# Patient Record
Sex: FEMALE | Race: WHITE | URBAN - METROPOLITAN AREA
[De-identification: names, ages, dates, MRNs, and addresses within clinical notes are randomized per-mention and may not be internally consistent; named-entity substitution may affect disease eponyms.]

---

## 2017-01-02 ENCOUNTER — OFFICE VISIT (OUTPATIENT)
Dept: FAMILY MEDICINE | Facility: CLINIC | Age: 22
End: 2017-01-02
Payer: COMMERCIAL

## 2017-01-02 VITALS
WEIGHT: 147 LBS | HEIGHT: 69 IN | HEART RATE: 63 BPM | DIASTOLIC BLOOD PRESSURE: 70 MMHG | RESPIRATION RATE: 15 BRPM | BODY MASS INDEX: 21.77 KG/M2 | TEMPERATURE: 99.6 F | SYSTOLIC BLOOD PRESSURE: 122 MMHG | OXYGEN SATURATION: 97 %

## 2017-01-02 DIAGNOSIS — B08.1 MOLLUSCUM CONTAGIOSUM: Primary | ICD-10-CM

## 2017-01-02 PROBLEM — Z30.41 ENCOUNTER FOR SURVEILLANCE OF CONTRACEPTIVE PILLS: Status: ACTIVE | Noted: 2017-01-02

## 2017-01-02 PROCEDURE — 99213 OFFICE O/P EST LOW 20 MIN: CPT | Performed by: PHYSICIAN ASSISTANT

## 2017-01-02 RX ORDER — DROSPIRENONE AND ETHINYL ESTRADIOL 0.03MG-3MG
1 KIT ORAL DAILY
Qty: 84 TABLET | Refills: 3 | Status: CANCELLED | OUTPATIENT
Start: 2017-01-02

## 2017-01-02 RX ORDER — IMIQUIMOD 12.5 MG/.25G
CREAM TOPICAL
Qty: 12 PACKET | Refills: 2 | Status: SHIPPED | OUTPATIENT
Start: 2017-01-02 | End: 2018-11-23

## 2017-01-02 NOTE — PATIENT INSTRUCTIONS
Molluscum Contagiosum   WHAT ARE MOLLUCSCUM?  Molluscum are smooth, pearly, flesh-colored skin growths caused by a virus that lives in the skin. They begin as small bumps and may grow as large as a pencil eraser. Many have a central pit where the virus bodies live. Usually, Molluscum are found on the face and body, but they may grow elsewhere.     Molluscum can be itchy and a red scaly rash can occur around the lesions termed  Molluscum dermatitis.  Molluscum can be spreads to other parts of the body as a child scratches. The bumps usually last from weeks to one and a half years and can go away on their own. Molluscum may be passed from child to child, but it is not infectious like chicken pox, and no isolation measures need to be taken. Clusters of infected children have been identified who used the same water park or pool, so they may be spread in pools or bathtubs. To prevent infecting others:  1. Keep areas with Molluscum covered with clothing or bandages when in contact with other people.   2. Do not share clothing, towels or other personal items; do not bathe an infected child with other individuals.     TREATMENT:  Although Molluscum will eventually resolve, they are often removed because they can itch, irritate, spread easily, become infected or are sometimes not cosmetically pleasing. Discomfort should be avoided when Molluscum is treated. Scarring is possible whether the lesions are treated or not.  Treatment depends on the age of the patient and the size and location of the growths.  1. Tretinoin (Retin-A) cream: This is often give for facial lesions. Apply to each bump with cotton tipped applicator once a day for several weeks. If irritation is severe, stop treatment for 1-2 days and then resume if necessary.    2. Liquid Nitrogen: Is applied with a special canister or cotton tipped applicator. If may form a blister or irritation at the site. Liquid nitrogen will not always remove the Molluscum.  Sometimes we recommend topical treatments following liquid nitrogen therapy; however you should not start these treatments until the site can tolerate them. Wait at least 7 days after liquid nitrogen therapy to begin/resume your topical treatments.  3. Imiquimod 5% cream (Aldara): Is an agent that locally stimulates the patient s immune system, or infection fighting abilities, and is helpful in some cases. It is not yet FDA approved for children less than 12 years of age, but is often used  off-label) in children for the treatment of both warts and Molluscum. The medicine can cause significant irritation in some children and for that reason we usually start treatment at three times a week, increasing slowly to daily application as tolerated. The cream should only be used on the affected areas, and extensive application can rarely cause  flu-like  symptoms.

## 2017-01-02 NOTE — PROGRESS NOTES
SUBJECTIVE:     CC: Janice Davis is an 21 year old woman who presents for preventive health visit.     Healthy Habits:  Answers for HPI/ROS submitted by the patient on 12/31/2016   Annual Exam:  Getting at least 3 servings of Calcium per day:: Yes  Bi-annual eye exam:: Yes  Dental care twice a year:: Yes  Sleep apnea or symptoms of sleep apnea:: None  Diet:: Regular (no restrictions)  Frequency of exercise:: 2-3 days/week  Duration of exercise:: 45-60 minutes  Taking medications regularly:: Yes  Medication side effects:: Not applicable  Additional concerns today:: No  PHQ-2 Depressed: Not at all, Not at all  PHQ-2 Score: 0    {Outside tests to abstract? :072591}    {additional problems to add:697118}    Today's PHQ-2 Score:   PHQ-2 ( 1999 Pf[employer]) 12/31/2016 12/29/2016   Q1: Little interest or pleasure in doing things - -   Q2: Feeling down, depressed or hopeless - -   PHQ-2 Score - -   Little interest or pleasure in doing things Not at all Not at all   Feeling down, depressed or hopeless Not at all Not at all   PHQ-2 Score 0 0     {PHQ-2 LOOK IN ASSESSMENTS :310428}  Abuse: Current or Past(Physical, Sexual or Emotional)- {YES/NO/NA:176551}  Do you feel safe in your environment - {YES/NO/NA:026346}    Social History   Substance Use Topics     Smoking status: Never Smoker      Smokeless tobacco: Never Used     Alcohol Use: No     {ETOH AUDIT:244511}    No results for input(s): CHOL, HDL, LDL, TRIG, CHOLHDLRATIO, NHDL in the last 60813 hours.    Reviewed orders with patient.  Reviewed health maintenance and updated orders accordingly - Yes    Mammo Decision Support:  Mammogram not appropriate for this patient based on age.    Pertinent mammograms are reviewed under the imaging tab.  History of abnormal Pap smear: NO - age 21-29 PAP every 3 years recommended  All Histories reviewed and updated in Epic.    ROS:  {normal premenopausal female:953931}    Problem list, Medication list, Allergies, and  "Medical/Social/Surgical histories reviewed in [facility] and updated as appropriate.  BP Readings from Last 3 Encounters:   08/30/16 118/84   08/09/16 124/86   08/06/15 108/70    Wt Readings from Last 3 Encounters:   08/30/16 144 lb 8 oz (65.545 kg)   08/09/16 147 lb (66.679 kg)   08/06/15 161 lb (73.029 kg) (87.75 %*)     * Growth percentiles are based on [facility] 2-20 Years data.         OBJECTIVE:     There were no vitals taken for this visit.  EXAM:  {FEMALE - complete :290223}    ASSESSMENT/PLAN:         ICD-10-CM    1. Routine general medical examination at a health care facility Z00.00    2. Screening for cervical cancer Z12.4    3. Molluscum contagiosum B08.1        COUNSELING:   {FEMALE COUNSELING MESSAGES:587872::\"Reviewed preventive health counseling, as reflected in patient instructions\"}       reports that she has never smoked. She has never used smokeless tobacco.    Estimated body mass index is 21.50 kg/(m^2) as calculated from the following:    Height as of 8/30/16: 5' 8.75\" (1.746 m).    Weight as of 8/30/16: 144 lb 8 oz (65.545 kg).       Counseling Resources:  ATP IV Guidelines  Pooled Cohorts Equation Calculator  Breast Cancer Risk Calculator  FRAX Risk Assessment  ICSI Preventive Guidelines  Dietary Guidelines for Americans, 2010  USDA's MyPlate  ASA Prophylaxis  Lung CA Screening    Clara Flores PA-C  [facility]  "

## 2017-01-02 NOTE — PROGRESS NOTES
SUBJECTIVE:                                                    Janice Davis is a 21 year old female who presents to clinic today for the following health issues:    Concern - mollusum growths     Onset: 2 weeks    Description:   Patient states that she has a growth in her vaginal area; not painful, 2, no drainage. Also has one on her right knee.    Intensity: mild, moderate    Progression of Symptoms:  worsening    Accompanying Signs & Symptoms:  See above   Previous history of similar problem:   n/a    Precipitating factors:   Worsened by: n/a    Alleviating factors:  Improved by: n/a       Therapies Tried and outcome: cut them off-biopsied   Additional complaints: None    HPI additional notes: Janice presents today with   Chief Complaint   Patient presents with     Other     molluscum growth   Previous lesion that was biopsied has not returned but has seen a new lesion in her upper thigh and and another by her right knee.         ROS:  C: Negative for fever, chills, recent change in weight  Skin: POSITIVE for skin lesion. Negative for discharge and pruritis  ENT: Negative for ear, mouth and throat problems  Resp: Negative for significant cough or SOB  CV: Negative for chest pain or peripheral edema  P: Negative for changes in mood or affect  ROS otherwise negative.    Chart Review:  History   Smoking status     Never Smoker    Smokeless tobacco     Never Used     Patient Active Problem List   Diagnosis     UC (ulcerative colitis) (H)     Encounter for surveillance of contraceptive pills     Molluscum contagiosum     Past Surgical History   Procedure Laterality Date     Endoscopy upper, colonoscopy, combined N/A 7/8/2015     Procedure: COMBINED ENDOSCOPY UPPER, COLONOSCOPY;  Surgeon: Mina Lloyd MD;  Location:  OR     Colonoscopy N/A 7/8/2015     Procedure: COMBINED COLONOSCOPY, SINGLE OR MULTIPLE BIOPSY/POLYPECTOMY BY BIOPSY;  Surgeon: Mina Lloyd MD;  Location:  OR     Esophagoscopy, gastroscopy,  "duodenoscopy (egd), combined N/A 7/8/2015     Procedure: COMBINED ESOPHAGOSCOPY, GASTROSCOPY, DUODENOSCOPY (EGD), BIOPSY SINGLE OR MULTIPLE;  Surgeon: Mina Lloyd MD;  Location: [facility] OR     Problem list, Medication list, Allergies, Medical/Social/Surg hx reviewed in [facility], updated as appropriate.   OBJECTIVE:                                                    /70 mmHg  Pulse 63  Temp(Src) 99.6  F (37.6  C) (Tympanic)  Resp 15  Ht 5' 9\" (1.753 m)  Wt 147 lb (66.679 kg)  BMI 21.70 kg/m2  SpO2 97%  LMP 12/12/2016 (Approximate)  Body mass index is 21.7 kg/(m^2).  GENERAL: healthy, alert, in no acute distress  SKIN: 3 3-4 mm flesh colored papules, first on lateral right knee, other two on medial right thigh, non-tender to palpation with no erythema, warmth or edema.  PSYCH: Alert and oriented times 3;  Able to articulate logical thoughts. Affect is normal.    Diagnostic test results: none      ASSESSMENT/PLAN:                                                          ICD-10-CM    1. Molluscum contagiosum B08.1 imiquimod (ALDARA) 5 % cream     Declines freezing today.  Will start at home treatment.  Side effects discussed.  Will send message if irritating skin and can try tretinoin or freezing in office.  Discussed risk of transmission.      Please see patient instructions for treatment details.    Follow up as needed.    Clara Flores PA-C  [facility]         "

## 2017-01-02 NOTE — NURSING NOTE
"Chief Complaint   Patient presents with     Other     molluscum growth       Initial /70 mmHg  Pulse 63  Temp(Src) 99.6  F (37.6  C) (Tympanic)  Resp 15  Ht 5' 9\" (1.753 m)  Wt 147 lb (66.679 kg)  BMI 21.70 kg/m2  SpO2 97% Estimated body mass index is 21.7 kg/(m^2) as calculated from the following:    Height as of this encounter: 5' 9\" (1.753 m).    Weight as of this encounter: 147 lb (66.679 kg).  BP completed using cuff size: regular    "

## 2017-09-17 ENCOUNTER — HEALTH MAINTENANCE LETTER (OUTPATIENT)
Age: 22
End: 2017-09-17

## 2018-02-07 ENCOUNTER — TELEPHONE (OUTPATIENT)
Dept: FAMILY MEDICINE | Facility: CLINIC | Age: 23
End: 2018-02-07

## 2018-02-07 NOTE — LETTER
February 7, 2018    Janice Davis  [address] [address]    Dear Tu Camacho cares about your health and your health plan.  I have reviewed your medical conditions, medication list and lab results, and am making recommendations based on this review to better manage your health.    You are in particular need of attention regarding:  -Cervical Cancer Screening  -Wellness (Physical) Visit   -GC Chlamydia Screening    I am recommending that you:     -schedule a WELLNESS (Physical) APPOINTMENT with me.       -schedule a PAP SMEAR EXAM which is due.  Please disregard this reminder if you have had this exam elsewhere within the last year.  It would be helpful for us to have a copy of your recent pap smear report in our file so that we can best coordinate your care.    If you are under/uninsured, we recommend you contact the [facility]. They offer pap smears at no charge or on a sliding fee charge. You can schedule with them at [phone]. Please have them send us the results.    -Be tested for Chlamydia      Annual testing is recommended for sexually active women between the ages of 15 and 25.     Chlamydia is the most common bacterial sexually transmitted disease in the [place], according to the Centers for Disease Control (CDC), yet many women considered at risk for the disease do not get the recommended annual screening test.     Chlamydia has no symptoms and left untreated, it can cause infertility and other serious health problems. Chlamydia is easily cured with antibiotics.  We have enclosed a brochure that gives you additional information about Chlamydia.    Testing is now usually done by leaving a urine sample with a lab-only appointment or you can make an office visit if you have other concerns. (If you are not recently or currently sexually active, then please contact us so we can update your medical record.)          Please call us at the [facility] location:  [phone]  or use [employer] to address the above recommendations.     Thank you for trusting [facility].  We appreciate the opportunity to serve you and look forward to supporting your healthcare in the future.    If you have (or plan to have) any of these tests done at a facility other than a [facility] or a [facility], please have the results sent to the [facility] location noted above.      Best Regards,    CECELIA King

## 2018-02-07 NOTE — TELEPHONE ENCOUNTER
Panel Management Review      Patient has the following on her problem list: None      Composite cancer screening  Chart review shows that this patient is due/due soon for the following Pap Smear  Summary:    Patient is due/failing the following:   Chlamydia screen, PAP and PHYSICAL    Action needed:   Patient needs office visit for physical/pap.    Type of outreach:    Sent letter.    Questions for provider review:    None                                                                                                                                    [facility]

## 2018-02-13 ENCOUNTER — TELEPHONE (OUTPATIENT)
Dept: NURSING | Facility: CLINIC | Age: 23
End: 2018-02-13

## 2018-02-13 NOTE — TELEPHONE ENCOUNTER
Pt had condom break yesterday and wanted to have copper IUD inserted. Pt informed that this is not done here by Dr. Olivier. Pt will contact [facility] for plan B or IUD.

## 2018-11-01 ENCOUNTER — TRANSFERRED RECORDS (OUTPATIENT)
Dept: HEALTH INFORMATION MANAGEMENT | Facility: CLINIC | Age: 23
End: 2018-11-01

## 2018-11-01 LAB
CHLAMYDIA - HIM PATIENT REPORTED: NEGATIVE
PAP SMEAR - HIM PATIENT REPORTED: NEGATIVE

## 2018-11-23 ENCOUNTER — OFFICE VISIT (OUTPATIENT)
Dept: FAMILY MEDICINE | Facility: CLINIC | Age: 23
End: 2018-11-23
Payer: COMMERCIAL

## 2018-11-23 VITALS
DIASTOLIC BLOOD PRESSURE: 84 MMHG | BODY MASS INDEX: 21.89 KG/M2 | HEIGHT: 68 IN | TEMPERATURE: 98.4 F | HEART RATE: 57 BPM | SYSTOLIC BLOOD PRESSURE: 125 MMHG | WEIGHT: 144.4 LBS

## 2018-11-23 DIAGNOSIS — Z11.1 SCREENING EXAMINATION FOR PULMONARY TUBERCULOSIS: ICD-10-CM

## 2018-11-23 DIAGNOSIS — K51.90 ULCERATIVE COLITIS WITHOUT COMPLICATIONS, UNSPECIFIED LOCATION (H): ICD-10-CM

## 2018-11-23 DIAGNOSIS — Z00.00 ROUTINE ADULT HEALTH MAINTENANCE: Primary | ICD-10-CM

## 2018-11-23 PROBLEM — B08.1 MOLLUSCUM CONTAGIOSUM: Status: RESOLVED | Noted: 2017-01-02 | Resolved: 2018-11-23

## 2018-11-23 PROBLEM — Z97.5 IUD (INTRAUTERINE DEVICE) IN PLACE: Status: ACTIVE | Noted: 2018-11-23

## 2018-11-23 PROBLEM — Z30.41 ENCOUNTER FOR SURVEILLANCE OF CONTRACEPTIVE PILLS: Status: RESOLVED | Noted: 2017-01-02 | Resolved: 2018-11-23

## 2018-11-23 PROCEDURE — 99395 PREV VISIT EST AGE 18-39: CPT | Performed by: INTERNAL MEDICINE

## 2018-11-23 PROCEDURE — 86580 TB INTRADERMAL TEST: CPT | Performed by: INTERNAL MEDICINE

## 2018-11-23 RX ORDER — COPPER 313.4 MG/1
1 INTRAUTERINE DEVICE INTRAUTERINE ONCE
COMMUNITY

## 2018-11-23 NOTE — MR AVS SNAPSHOT
After Visit Summary   11/23/2018    Janice Davis    MRN: [id number]           Patient Information     Date Of Birth          1995        Visit Information        Provider Department      11/23/2018 11:40 AM Sheron Verdin MD [facility]        Today's Diagnoses     Ulcerative colitis without complications, unspecified location (H)    -  1    Screening examination for venereal disease        Screening for malignant neoplasm of cervix        Screening for HIV (human immunodeficiency virus)        Need for prophylactic vaccination and inoculation against influenza          Care Instructions      Preventive Health Recommendations  Female Ages 21 to 25     Yearly exam:     See your health care provider every year in order to  o Review health changes.   o Discuss preventive care.    o Review your medicines if your doctor has prescribed any.      You should be tested each year for STDs (sexually transmitted diseases).       Talk to your provider about how often you should have cholesterol testing.      Get a Pap test every three years. If you have an abnormal result, your doctor may have you test more often.      If you are at risk for diabetes, you should have a diabetes test (fasting glucose).     Shots:     Get a flu shot each year.     Get a tetanus shot every 10 years.     Consider getting the shot (vaccine) that prevents cervical cancer (Gardasil).    Nutrition:     Eat at least 5 servings of fruits and vegetables each day.    Eat whole-grain bread, whole-wheat pasta and brown rice instead of white grains and rice.    Get adequate Calcium and Vitamin D.     Lifestyle    Exercise at least 150 minutes a week each week (30 minutes a day, 5 days a week). This will help you control your weight and prevent disease.    Limit alcohol to one drink per day.    No smoking.     Wear sunscreen to prevent skin cancer.    See your dentist every six months for an exam and  cleaning.          Follow-ups after your visit        Additional Services     GASTROENTEROLOGY ADULT REF CONSULT ONLY       Preferred Location: [facility] [phone] and [facility] [phone]      Please be aware that coverage of these services is subject to the terms and limitations of your health insurance plan.  Call member services at your health plan with any benefit or coverage questions.  Any procedures must be performed at a [facility] facility OR coordinated by your clinic's referral office.    Please bring the following with you to your appointment:    (1) Any X-Rays, CTs or MRIs which have been performed.  Contact the facility where they were done to arrange for  prior to your scheduled appointment.    (2) List of current medications   (3) This referral request   (4) Any documents/labs given to you for this referral                  Follow-up notes from your care team     Return in about 3 days (around 11/26/2018) for PPD read.      Who to contact     If you have questions or need follow up information about today's clinic visit or your schedule please contact [facility] directly at [phone].  Normal or non-critical lab and imaging results will be communicated to you by [employer], letter or phone within 4 business days after the clinic has received the results. If you do not hear from us within 7 days, please contact the clinic through [employer] or phone. If you have a critical or abnormal lab result, we will notify you by phone as soon as possible.  Submit refill requests through "[employer] or call your pharmacy and they will forward the refill request to us. Please allow 3 business days for your refill to be completed.          Additional Information About Your Visit        "[employer] Information     "[employer] gives you secure access to your electronic health record. If you see a primary care provider, you can also send messages to your care team and make appointments. If you  "have questions, please call your primary care clinic.  If you do not have a primary care provider, please call [phone] and they will assist you.        Care EveryWhere ID     This is your Care EveryWhere ID. This could be used by other organizations to access your [facility] medical records  [id number]        Your Vitals Were     Pulse Temperature Height Last Period BMI (Body Mass Index)       57 98.4  F (36.9  C) (Oral) 5' 8.11\" (1.73 m) 11/02/2018 (Approximate) 21.89 kg/m2        Blood Pressure from Last 3 Encounters:   11/23/18 125/84   01/02/17 122/70   08/30/16 118/84    Weight from Last 3 Encounters:   11/23/18 144 lb 6.4 oz (65.5 kg)   01/02/17 147 lb (66.7 kg)   08/30/16 144 lb 8 oz (65.5 kg)              We Performed the Following     GASTROENTEROLOGY ADULT REF CONSULT ONLY          Today's Medication Changes          These changes are accurate as of 11/23/18 12:19 PM.  If you have any questions, ask your nurse or doctor.               Stop taking these medicines if you haven't already. Please contact your care team if you have questions.     imiquimod 5 % cream   Commonly known as:  ALDARA   Stopped by:  Sheron Verdin MD           MANASA 28 3-0.03 MG per tablet   Generic drug:  drospirenone-ethinyl estradiol   Stopped by:  Sheron Verdin MD                    Primary Care Provider Office Phone # Fax #    Shannan Farnsworth [phone] [address] [facility] [address]        Equal Access to Services     [facility] AH: Hadii aad ku hadasho Sovishnu, waaxda luqadaha, qaybta kaalmada nuzhat ortiz. So [facility] [phone].    ATENCIÓN: Si habla español, tiene a clrak disposición servicios gratuitos de asistencia lingüística. Llame al [phone].    We comply with applicable federal civil rights laws and [place] laws. We do not discriminate on the basis of race, color, national origin, age, disability, sex, sexual orientation, or " gender identity.            Thank you!     Thank you for choosing [facility]  for your care. Our goal is always to provide you with excellent care. Hearing back from our patients is one way we can continue to improve our services. Please take a few minutes to complete the written survey that you may receive in the mail after your visit with us. Thank you!             Your Updated Medication List - Protect others around you: Learn how to safely use, store and throw away your medicines at www.disposemymeds.org.          This list is accurate as of 11/23/18 12:19 PM.  Always use your most recent med list.                   Brand Name Dispense Instructions for use Diagnosis    paragard intrauterine copper      1 each by Intrauterine route once Placed 2/2018

## 2018-11-23 NOTE — PROGRESS NOTES
SUBJECTIVE:   CC: Janice Davis is an 23 year old woman who presents for preventive health visit.     Currently working at [employer]. 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    Healthy Habits:    Do you get at least three servings of calcium containing foods daily (dairy, green leafy vegetables, etc.)? yes    Amount of exercise or daily activities, outside of work: 4 -5 day(s) per week    Problems taking medications regularly No    Medication side effects: No    Have you had an eye exam in the past two years? yes    Do you see a dentist twice per year? yes    Do you have sleep apnea, excessive snoring or daytime drowsiness?no    UC - currently no treatment or symptoms.  Aged out of [facility] [place] [facility], would like to establish with and adult GI provider.     Needs form for dietetic program     Saw [facility] last week for STD testing and pap smear       Today's PHQ-2 Score:   PHQ-2 ( 1999 [employer]) 11/23/2018 1/2/2017   Q1: Little interest or pleasure in doing things 0 0   Q2: Feeling down, depressed or hopeless 0 0   PHQ-2 Score 0 0   Q1: Little interest or pleasure in doing things - -   Q2: Feeling down, depressed or hopeless - -   PHQ-2 Score - -       Abuse: Current or Past(Physical, Sexual or Emotional)- No  Do you feel safe in your environment - Yes    Social History   Substance Use Topics     Smoking status: Never Smoker     Smokeless tobacco: Never Used     Alcohol use Yes      Comment: socially      If you drink alcohol do you typically have >3 drinks per day or >7 drinks per week? No                     Reviewed orders with patient.  Reviewed health maintenance and updated orders accordingly - Yes  Patient Active Problem List   Diagnosis     UC (ulcerative colitis) (H)     Paraguard IUD - placed 2/2018     Past Surgical History:   Procedure Laterality Date     COLONOSCOPY N/A 7/8/2015    Procedure: COMBINED COLONOSCOPY, SINGLE OR MULTIPLE BIOPSY/POLYPECTOMY BY BIOPSY;   Surgeon: Mina Lloyd MD;  Location: [facility] OR     ENDOSCOPY UPPER, COLONOSCOPY, COMBINED N/A 7/8/2015    Procedure: COMBINED ENDOSCOPY UPPER, COLONOSCOPY;  Surgeon: Mina Lloyd MD;  Location: [facility]     ESOPHAGOSCOPY, GASTROSCOPY, DUODENOSCOPY (EGD), COMBINED N/A 7/8/2015    Procedure: COMBINED ESOPHAGOSCOPY, GASTROSCOPY, DUODENOSCOPY (EGD), BIOPSY SINGLE OR MULTIPLE;  Surgeon: Mina Lloyd MD;  Location: [facility] OR       Social History   Substance Use Topics     Smoking status: Never Smoker     Smokeless tobacco: Never Used     Alcohol use Yes      Comment: socially      Family History   Problem Relation Age of Onset     Hypertension Father          Current Outpatient Prescriptions   Medication Sig Dispense Refill     paragard intrauterine copper 1 each by Intrauterine route once Placed 2/2018         Mammogram not appropriate for this patient based on age.    Pertinent mammograms are reviewed under the imaging tab.  History of abnormal Pap smear: NO - age 21-29 PAP every 3 years recommended     Reviewed and updated as needed this visit by clinical staff  Tobacco  Allergies  Meds  Problems  Med Hx  Surg Hx  Fam Hx  Soc Hx          Reviewed and updated as needed this visit by Provider  Tobacco  Allergies  Meds  Problems  Med Hx  Surg Hx  Fam Hx  Soc Hx             ROS:  CONSTITUTIONAL: NEGATIVE for fever, chills, change in weight  INTEGUMENTARU/SKIN: NEGATIVE for worrisome rashes, moles or lesions  EYES: NEGATIVE for vision changes or irritation  ENT: NEGATIVE for ear, mouth and throat problems  RESP: NEGATIVE for significant cough or SOB  BREAST: NEGATIVE for masses, tenderness or discharge  CV: NEGATIVE for chest pain, palpitations or peripheral edema  GI: NEGATIVE for nausea, abdominal pain, heartburn, or change in bowel habits  : NEGATIVE for unusual urinary or vaginal symptoms. Periods are regular.  MUSCULOSKELETAL: NEGATIVE for significant arthralgias or myalgia  NEURO: NEGATIVE for weakness,  "dizziness or paresthesias  PSYCHIATRIC: NEGATIVE for changes in mood or affect    OBJECTIVE:   /84 (BP Location: Right arm, Patient Position: Chair, Cuff Size: Adult Regular)  Pulse 57  Temp 98.4  F (36.9  C) (Oral)  Ht 5' 8.11\" (1.73 m)  Wt 144 lb 6.4 oz (65.5 kg)  LMP 11/02/2018 (Approximate)  BMI 21.89 kg/m2  EXAM:  GENERAL: healthy, alert and no distress  EYES: Eyes grossly normal to inspection, PERRL and conjunctivae and sclerae normal  HENT: ear canals and TM's normal, nose and mouth without ulcers or lesions  NECK: no adenopathy, no asymmetry, masses, or scars and thyroid normal to palpation  RESP: lungs clear to auscultation - no rales, rhonchi or wheezes  CV: regular rate and rhythm, normal S1 S2, no S3 or S4, no murmur, click or rub, no peripheral edema and peripheral pulses strong  ABDOMEN: soft, nontender, no hepatosplenomegaly, no masses and bowel sounds normal  MS: no gross musculoskeletal defects noted, no edema  SKIN: no suspicious lesions or rashes  NEURO: Normal strength and tone, mentation intact and speech normal  PSYCH: mentation appears normal, affect normal/bright    Diagnostic Test Results:  none     ASSESSMENT/PLAN:   1. Routine adult health maintenance    2. Ulcerative colitis without complications, unspecified location (H)  - GASTROENTEROLOGY ADULT REF CONSULT ONLY          COUNSELING:   Reviewed preventive health counseling, as reflected in patient instructions  Special attention given to:        Regular exercise       Healthy diet/nutrition       Contraception       Osteoporosis Prevention/Bone Health    BP Readings from Last 1 Encounters:   11/23/18 125/84     Estimated body mass index is 21.89 kg/(m^2) as calculated from the following:    Height as of this encounter: 5' 8.11\" (1.73 m).    Weight as of this encounter: 144 lb 6.4 oz (65.5 kg).           reports that she has never smoked. She has never used smokeless tobacco.      Counseling Resources:  ATP IV " Guidelines  Pooled Cohorts Equation Calculator  Breast Cancer Risk Calculator  FRAX Risk Assessment  ICSI Preventive Guidelines  Dietary Guidelines for Americans, 2010  [employer] MyPlate  ASA Prophylaxis  Lung CA Screening    Sheron Verdin MD  [facility] [place]

## 2019-11-08 ENCOUNTER — HEALTH MAINTENANCE LETTER (OUTPATIENT)
Age: 24
End: 2019-11-08

## 2020-02-23 ENCOUNTER — HEALTH MAINTENANCE LETTER (OUTPATIENT)
Age: 25
End: 2020-02-23

## 2020-12-06 ENCOUNTER — HEALTH MAINTENANCE LETTER (OUTPATIENT)
Age: 25
End: 2020-12-06

## 2021-04-11 ENCOUNTER — HEALTH MAINTENANCE LETTER (OUTPATIENT)
Age: 26
End: 2021-04-11

## 2021-09-25 ENCOUNTER — HEALTH MAINTENANCE LETTER (OUTPATIENT)
Age: 26
End: 2021-09-25

## 2022-05-07 ENCOUNTER — HEALTH MAINTENANCE LETTER (OUTPATIENT)
Age: 27
End: 2022-05-07

## 2023-04-22 ENCOUNTER — HEALTH MAINTENANCE LETTER (OUTPATIENT)
Age: 28
End: 2023-04-22

## 2023-06-02 ENCOUNTER — HEALTH MAINTENANCE LETTER (OUTPATIENT)
Age: 28
End: 2023-06-02